# Patient Record
Sex: FEMALE | Race: WHITE | NOT HISPANIC OR LATINO | Employment: OTHER | URBAN - METROPOLITAN AREA
[De-identification: names, ages, dates, MRNs, and addresses within clinical notes are randomized per-mention and may not be internally consistent; named-entity substitution may affect disease eponyms.]

---

## 2017-10-06 ENCOUNTER — OFFICE VISIT (OUTPATIENT)
Dept: NEUROLOGY | Facility: CLINIC | Age: 52
End: 2017-10-06

## 2017-10-06 VITALS
WEIGHT: 195 LBS | SYSTOLIC BLOOD PRESSURE: 124 MMHG | BODY MASS INDEX: 28.88 KG/M2 | DIASTOLIC BLOOD PRESSURE: 82 MMHG | HEIGHT: 69 IN

## 2017-10-06 DIAGNOSIS — Z86.73 HISTORY OF STROKE: Primary | ICD-10-CM

## 2017-10-06 PROCEDURE — 99204 OFFICE O/P NEW MOD 45 MIN: CPT | Performed by: PHYSICIAN ASSISTANT

## 2017-10-06 RX ORDER — METOPROLOL SUCCINATE 100 MG/1
TABLET, EXTENDED RELEASE ORAL
COMMUNITY
Start: 2017-10-03

## 2017-10-06 RX ORDER — TRAMADOL HYDROCHLORIDE 50 MG/1
TABLET ORAL
COMMUNITY
Start: 2017-08-23

## 2017-10-06 RX ORDER — MELOXICAM 7.5 MG/1
TABLET ORAL
COMMUNITY
Start: 2017-10-02 | End: 2020-06-02

## 2017-10-06 RX ORDER — ATORVASTATIN CALCIUM 20 MG/1
TABLET, FILM COATED ORAL
COMMUNITY
Start: 2017-09-25

## 2017-10-06 RX ORDER — PANTOPRAZOLE SODIUM 40 MG/1
TABLET, DELAYED RELEASE ORAL
COMMUNITY
Start: 2017-09-16

## 2017-10-06 RX ORDER — CLOPIDOGREL BISULFATE 75 MG/1
TABLET ORAL
COMMUNITY
Start: 2017-09-24

## 2017-10-06 RX ORDER — SERTRALINE HYDROCHLORIDE 100 MG/1
TABLET, FILM COATED ORAL
COMMUNITY
Start: 2017-09-18

## 2017-10-06 RX ORDER — FUROSEMIDE 40 MG/1
40 TABLET ORAL
COMMUNITY
Start: 2016-11-28

## 2017-10-06 RX ORDER — LISINOPRIL 5 MG/1
TABLET ORAL
COMMUNITY
Start: 2017-09-10

## 2017-10-06 RX ORDER — ALBUTEROL SULFATE 2.5 MG/3ML
SOLUTION RESPIRATORY (INHALATION)
COMMUNITY
Start: 2017-07-11

## 2017-10-06 RX ORDER — CHLORHEXIDINE GLUCONATE 0.12 MG/ML
RINSE ORAL
COMMUNITY
Start: 2017-07-10 | End: 2020-06-02

## 2017-10-06 RX ORDER — ESTRADIOL 2 MG/1
2 TABLET ORAL
COMMUNITY
Start: 2016-12-06

## 2017-10-06 RX ORDER — GABAPENTIN 300 MG/1
CAPSULE ORAL
COMMUNITY
Start: 2017-09-26 | End: 2020-06-02

## 2017-10-06 RX ORDER — BUPROPION HYDROCHLORIDE 300 MG/1
TABLET ORAL
COMMUNITY
Start: 2017-10-03

## 2017-10-06 RX ORDER — BACLOFEN 10 MG/1
TABLET ORAL
COMMUNITY
Start: 2017-08-07 | End: 2020-06-02

## 2017-10-06 NOTE — PROGRESS NOTES
Subjective     History of Present Illness   Paradise Robles is a 52 y.o. female who comes to clinic today with a history of strokes. She initially had a stroke in 3/16 with resulting left arm weakness and numbness. She denies any additional associated neurological symptoms at this time. She was seen at [facility] where an MRI was reportedly notable for an acute stroke in the right MCA territory. An echo was unremarkable.  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 She was subsequently treated with ASA and Plavix as well as Lipitor. She notes that her left arm weakness has significantly improved, though has not completely resolved.     She then notes a second stroke in 1/17 following a lumbar surgery at [place]. She noted right sided weakness, numbness, and paresthesias as well as associated loss of vision on the right. She was also noted to have significant confusion at this time. She denies any additional associated neurological symptoms. An MRI at this time was reportedly notable for multiple areas of restricted diffusion in the left frontal and parietal lobes. A CTA of the head and neck was notable for moderate stenosis of the LICA as well as an occluded left vertebral artery. She was subsequently restarted on ASA and Plavix. She states that her right sided weakness has improved. Her loss of vision has resolved.     Additional workup has included an coagulation profile which was reportedly unremarkable.       I have reviewed and confirmed the past family, social and medical history as accurate on 10/6/17.     Review of Systems   Constitutional: Negative.    HENT: Negative.    Eyes: Negative.    Respiratory: Negative.    Cardiovascular: Negative.    Gastrointestinal: Negative.    Endocrine: Negative.    Genitourinary: Negative.    Musculoskeletal: Negative.    Skin: Negative.    Allergic/Immunologic: Negative.    Neurological:     "    As noted in HPI   Hematological: Negative.    Psychiatric/Behavioral: Negative.        Objective     /82  Ht 69\" (175.3 cm)  Wt 195 lb (88.5 kg)  BMI 28.8 kg/m2    General appearance today is normal.       Physical Exam   Neurological: She has a normal Finger-Nose-Finger Test.   Reflex Scores:       Tricep reflexes are 1+ on the right side and 1+ on the left side.       Bicep reflexes are 1+ on the right side and 1+ on the left side.       Brachioradialis reflexes are 1+ on the right side and 1+ on the left side.       Patellar reflexes are 1+ on the right side and 1+ on the left side.  Psychiatric: Her speech is normal.        Neurologic Exam     Mental Status   Speech: speech is normal   Level of consciousness: alert  Normal comprehension.     Cranial Nerves   Cranial nerves II through XII intact.     Motor Exam   Muscle bulk: normal  Overall muscle tone: normal    Strength   Strength 5/5 except as noted.        Weakness noted in left upper extremity (4+/5) and right upper and lower extremity (4-/5)       Sensory Exam        Decreased sensation to touch on right.      Gait, Coordination, and Reflexes     Gait  Gait: (slightly unsteady.)    Coordination   Finger to nose coordination: normal    Tremor   Resting tremor: absent    Reflexes   Right brachioradialis: 1+  Left brachioradialis: 1+  Right biceps: 1+  Left biceps: 1+  Right triceps: 1+  Left triceps: 1+  Right patellar: 1+  Left patellar: 1+        Assessment/Plan   Paradise was seen today for stroke.    Diagnoses and all orders for this visit:    History of stroke          Discussion/Summary   Paradise Robles comes to clinic today with a history of strokes. Her workup has been complete and appropriate. Therefore, I do not have any new recommendations concerning this. It was elected to obtain her records from her hospitalizations at [facility] and [place], including neuroimaging. She will continue on ASA, Plavix, and Lipitor unchanged. She will also " continue to follow with vascular surgery regarding her LICA stenosis. She will then follow up in our clinic on an as needed basis.      I spent 35 minutes out of 45 minutes face to face with the patient and discussing diagnosis, prognosis, diagnostic testing, evaluation, current status, treatment options and management.    As part of this visit I reviewed radiology results, reviewed outside records and obtained additional history from the family which is incorporated in the HPI.    Cathy Cedillo PA-C

## 2020-06-02 ENCOUNTER — OFFICE VISIT (OUTPATIENT)
Dept: NEUROSURGERY | Facility: CLINIC | Age: 55
End: 2020-06-02

## 2020-06-02 VITALS
TEMPERATURE: 97.3 F | HEART RATE: 56 BPM | HEIGHT: 69 IN | DIASTOLIC BLOOD PRESSURE: 78 MMHG | OXYGEN SATURATION: 93 % | WEIGHT: 194.8 LBS | SYSTOLIC BLOOD PRESSURE: 114 MMHG | BODY MASS INDEX: 28.85 KG/M2 | RESPIRATION RATE: 20 BRPM

## 2020-06-02 DIAGNOSIS — M79.605 LEFT LEG PAIN: ICD-10-CM

## 2020-06-02 DIAGNOSIS — Z98.1 HISTORY OF LUMBAR FUSION: ICD-10-CM

## 2020-06-02 DIAGNOSIS — M51.36 DDD (DEGENERATIVE DISC DISEASE), LUMBAR: Primary | ICD-10-CM

## 2020-06-02 PROCEDURE — 99214 OFFICE O/P EST MOD 30 MIN: CPT | Performed by: NEUROLOGICAL SURGERY

## 2020-06-02 RX ORDER — PROPRANOLOL HYDROCHLORIDE 20 MG/1
TABLET ORAL
COMMUNITY
Start: 2020-05-27

## 2020-06-02 RX ORDER — DIAZEPAM 10 MG/1
TABLET ORAL
COMMUNITY
Start: 2020-04-07

## 2020-06-02 RX ORDER — ASPIRIN 81 MG/1
81 TABLET ORAL DAILY
COMMUNITY

## 2020-06-02 RX ORDER — RIVASTIGMINE 4.6 MG/24H
PATCH, EXTENDED RELEASE TRANSDERMAL
COMMUNITY
Start: 2020-05-27

## 2020-06-02 RX ORDER — FLUOXETINE HYDROCHLORIDE 40 MG/1
CAPSULE ORAL
COMMUNITY
Start: 2020-05-27

## 2020-06-02 NOTE — PROGRESS NOTES
Paradise Robles  1965  [id number]      Chief Complaint   Patient presents with   • Establish Care   • Back Pain       HISTORY OF PRESENT ILLNESS: This is a 55-year-old female seen with a chief complaint of severe back pain which on occasionally radiates into her left lower extremity.  The predominance of her pain is axial rather than radicular.  She has had back pain for several years.  She underwent PLIF L4-S1 in 2017.  She states she was doing reasonably well for approximately 1 year at which time all symptoms recurred.  A dorsal column stimulator has been inserted and is of minimal help.    The predominance of her symptoms are in the mid to upper lumbar area and in her hip.  She does not have neurogenic claudication.  The pain in her lower extremities is somewhat ill-defined, nondermatomal and not that usually associated with an isolated nerve root entrapment.    Past Medical History:   Diagnosis Date   • Arthritis    • Asthma    • Back problem    • Bronchitis    • Depression    • Hypertension    • Stroke (cerebrum) (CMS/HCC)    • Stroke (CMS/HCC)        Past Surgical History:   Procedure Laterality Date   • APPENDECTOMY     • HYSTERECTOMY     • LUMBAR FUSION      Dr. Reymundo Lofton & Dr. Quinn       Family History   Problem Relation Age of Onset   • Cancer Mother    • Cancer Father    • Heart disease Father    • Heart disease Brother        Social History     Socioeconomic History   • Marital status:      Spouse name: Not on file   • Number of children: Not on file   • Years of education: Not on file   • Highest education level: Not on file   Tobacco Use   • Smoking status: Former Smoker     Packs/day: 1.50     Years: 38.00     Pack years: 57.00     Types: Cigarettes     Last attempt to quit: 3/31/2016     Years since quittin.1   • Smokeless tobacco: Never Used   Substance and Sexual Activity   • Alcohol use: Never     Frequency: Never   • Drug use: Never       Allergies   Allergen  Reactions   • Codeine Itching   • Levofloxacin Hives   • Oseltamivir Swelling     Swollen entire body         Current Outpatient Medications:   •  albuterol (PROVENTIL) (2.5 MG/3ML) 0.083% nebulizer solution, , Disp: , Rfl:   •  aspirin 81 MG EC tablet, Take 81 mg by mouth Daily., Disp: , Rfl:   •  atorvastatin (LIPITOR) 20 MG tablet, , Disp: , Rfl:   •  buPROPion XL (WELLBUTRIN XL) 300 MG 24 hr tablet, , Disp: , Rfl:   •  clopidogrel (PLAVIX) 75 MG tablet, , Disp: , Rfl:   •  estradiol (ESTRACE) 2 MG tablet, Take 2 mg by mouth., Disp: , Rfl:   •  FLUoxetine (PROzac) 40 MG capsule, , Disp: , Rfl:   •  furosemide (LASIX) 40 MG tablet, Take 40 mg by mouth., Disp: , Rfl:   •  lisinopril (PRINIVIL,ZESTRIL) 5 MG tablet, , Disp: , Rfl:   •  LYRICA 150 MG capsule, , Disp: , Rfl:   •  metoprolol succinate XL (TOPROL-XL) 100 MG 24 hr tablet, , Disp: , Rfl:   •  mometasone-formoterol (DULERA) 200-5 MCG/ACT inhaler, , Disp: , Rfl:   •  pantoprazole (PROTONIX) 40 MG EC tablet, , Disp: , Rfl:   •  propranolol (INDERAL) 20 MG tablet, , Disp: , Rfl:   •  rivastigmine (EXELON) 4.6 MG/24HR patch, , Disp: , Rfl:   •  sertraline (ZOLOFT) 100 MG tablet, , Disp: , Rfl:   •  traMADol (ULTRAM) 50 MG tablet, , Disp: , Rfl:   •  TRELEGY ELLIPTA 100-62.5-25 MCG/INH aerosol powder , , Disp: , Rfl:   •  diazePAM (VALIUM) 10 MG tablet, , Disp: , Rfl:     Review of Systems   Constitutional: Negative for activity change, appetite change, chills, diaphoresis, fatigue, fever and unexpected weight change.   HENT: Negative for congestion, dental problem, drooling, ear discharge, ear pain, facial swelling, hearing loss, mouth sores, nosebleeds, postnasal drip, rhinorrhea, sinus pressure, sneezing, sore throat, tinnitus, trouble swallowing and voice change.    Eyes: Negative for photophobia, pain, discharge, redness, itching and visual disturbance.   Respiratory: Negative for apnea, cough, choking, chest tightness, shortness of breath, wheezing and  "stridor.    Cardiovascular: Positive for palpitations and leg swelling. Negative for chest pain.   Gastrointestinal: Negative for abdominal distention, abdominal pain, anal bleeding, blood in stool, constipation, diarrhea, nausea, rectal pain and vomiting.        Stress incontinence   Endocrine: Negative for cold intolerance, heat intolerance, polydipsia, polyphagia and polyuria.   Genitourinary: Negative for decreased urine volume, difficulty urinating, dysuria, enuresis, flank pain, frequency, genital sores, hematuria and urgency.   Musculoskeletal: Positive for back pain and neck pain. Negative for arthralgias, gait problem, joint swelling, myalgias and neck stiffness.   Skin: Negative for color change, pallor, rash and wound.   Allergic/Immunologic: Negative for environmental allergies, food allergies and immunocompromised state.   Neurological: Positive for tremors and numbness. Negative for dizziness, seizures, syncope, facial asymmetry, speech difficulty, weakness, light-headedness and headaches.   Hematological: Negative for adenopathy. Bruises/bleeds easily.   Psychiatric/Behavioral: Positive for dysphoric mood. Negative for agitation, behavioral problems, confusion, decreased concentration, hallucinations, self-injury, sleep disturbance and suicidal ideas. The patient is not nervous/anxious and is not hyperactive.         Memory loss   All other systems reviewed and are negative.      Vitals:    06/02/20 0910   BP: 114/78   Pulse: 56   Resp: 20   Temp: 97.3 °F (36.3 °C)   TempSrc: Temporal   SpO2: 93%   Weight: 88.4 kg (194 lb 12.8 oz)   Height: 175.3 cm (69\")       Neurological Examination:    Mental status/speech: The patient is alert and oriented.  Speech is clear without aphysia or dysarthria.  No overt cognitive deficits.    Cranial nerve examination:    Olfaction: Smell is intact.  Vision: Vision is intact without visual field abnormalities.  Funduscopic examination is normal.  No pupillary " irregularity.  Ocular motor examination: The extraocular muscles are intact.  There is no diplopia.  The pupil is round and reactive to both light and accommodation.  There is no nystagmus.  Facial movement/sensation: There is no facial weakness.  Sensation is intact in the first, second, and third divisions of the trigeminal nerve.  The corneal reflex is intact.  Auditory: Hearing is intact to finger rub bilaterally.  Cranial nerves IX, X, XI, XII: Phonation is normal.  No dysphagia.  Tongue is protruded in the midline without atrophy.  The gag reflex is intact.  Shoulder shrug is normal.    Musculoligamentous ligamentous examination: Straight leg raising, Lasègue and flip test are negative.  She has limited range of motion of the lumbar spine as expected secondary to PLIF.  Her strength is intact in all muscle groups and there is only spotty sensory loss.  Her gait is normal    Medical Decision Making:     Diagnostic Data Set: The lumbar MRI data set shows postsurgical change L4-S1.  At L3-L4 she has facet hypertrophy on the left side with slight closure of the neuroforamen.  There is no high-grade spinal stenosis however.      Assessment: Postlaminectomy syndrome          Recommendations: The major problem that she has is nonradicular back pain.  Although she has a facet that is enlarged at L3-4 on the left side this does not provide a complete anatomical/clinical answer therefore, in my opinion, surgery is not justifiable.  Decompression of the nerve root at L3-4 on the left side is not going to help her back pain.  Whether or not she would need to have the fusion extended cephalad would be predicated on her initial surgery, the diagnostic studies done at that time and this surgical procedure performed.  Thus, I would defer to her original surgeons who did the PLIF should surgery be pursued.  She is rather reluctant because of the strokes that she has had in her past.  That is certainly understandable.   Therefore, I would consider and continue a conservative approach with dorsal column stimulator and pain management issue have unless the pain in her back becomes overwhelming.  Should that be the case I would recommend that she be reevaluated by the initial surgeons.  Unfortunately, I am unaware of any operation that in and of itself is going to correct longstanding, chronic back pain.  Thank you for allow me to see her.  I have shared these views with her.        I greatly appreciate the opportunity to see and evaluate this individual.  If you have questions or concerns regarding issues that I may have overlooked please call me at any time: [phone].  Ubaldo Scott M.D.  [facility]  [address]  [place] [address]      Scribed for Yoni Scott MD by Marylin Reyes CMA. 6/2/2020 09:46